# Patient Record
Sex: MALE | Race: WHITE | URBAN - METROPOLITAN AREA
[De-identification: names, ages, dates, MRNs, and addresses within clinical notes are randomized per-mention and may not be internally consistent; named-entity substitution may affect disease eponyms.]

---

## 2017-02-14 ENCOUNTER — OFFICE VISIT (OUTPATIENT)
Dept: FAMILY MEDICINE | Facility: CLINIC | Age: 22
End: 2017-02-14

## 2017-02-14 VITALS
HEART RATE: 83 BPM | WEIGHT: 210 LBS | SYSTOLIC BLOOD PRESSURE: 138 MMHG | BODY MASS INDEX: 28.44 KG/M2 | HEIGHT: 72 IN | TEMPERATURE: 97.5 F | DIASTOLIC BLOOD PRESSURE: 78 MMHG

## 2017-02-14 DIAGNOSIS — B34.9 VIRAL SYNDROME: Primary | ICD-10-CM

## 2017-02-14 RX ORDER — FLUTICASONE PROPIONATE 50 MCG
1-2 SPRAY, SUSPENSION (ML) NASAL DAILY
Qty: 1 BOTTLE | Refills: 11 | Status: SHIPPED | OUTPATIENT
Start: 2017-02-14

## 2017-02-14 RX ORDER — OSELTAMIVIR PHOSPHATE 75 MG/1
75 CAPSULE ORAL 2 TIMES DAILY
Qty: 10 CAPSULE | Refills: 0 | Status: SHIPPED | OUTPATIENT
Start: 2017-02-14

## 2017-02-14 NOTE — LETTER
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  Shivers and chills and ST.  No fever this am.  Sleeping a lot.  Coughing some today.  No ear pain. No flu shot.  No abdominal pain or symptoms.    NKDA    O: NAD  Congested  /78  Pulse 83  Temp 97.5  F (36.4  C)  Ht 1.829 m (6')  Wt 95.3 kg (210 lb)  BMI 28.48 kg/m2  HEENT:  Moderate erythema without exudates  Neck: no LAD, supple  Lungs: CTA  Heart; rrr    A: Viral Syndrome: Possible influenza  P:  Tamiflu 75mg bid for 5 days  Flonase nasal spray  Rest  Fluids  No practice today.  They are off tomorrow.  Ok to practice on Thursday if afebrile x 24 hours and feeling better.     Siri Garza, Sports Medicine Fellow and Christianne Smith, MARIAM for football were present for the entire appt.     Kaela Jones MD, CAQ, FACSM, CCD  [facility]  Sports Medicine and Bone Health  Team Physician;  Athletics        Kaela Jones MD

## 2017-02-14 NOTE — LETTER
Date:February 15, 2017      Patient was self referred, no letter generated. Do not send.        [facility] Health Information

## 2017-02-14 NOTE — PROGRESS NOTES
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  Shivers and chills and ST.  No fever this am.  Sleeping a lot.  Coughing some today.  No ear pain. No flu shot.  No abdominal pain or symptoms.    NKDA    O: NAD  Congested  /78  Pulse 83  Temp 97.5  F (36.4  C)  Ht 1.829 m (6')  Wt 95.3 kg (210 lb)  BMI 28.48 kg/m2  HEENT:  Moderate erythema without exudates  Neck: no LAD, supple  Lungs: CTA  Heart; rrr    A: Viral Syndrome: Possible influenza  P:  Tamiflu 75mg bid for 5 days  Flonase nasal spray  Rest  Fluids  No practice today.  They are off tomorrow.  Ok to practice on Thursday if afebrile x 24 hours and feeling better.     Siri Garza, Sports Medicine Fellow and Christianne Smith, MARIAM for football were present for the entire appt.     Kaela Jones MD, CAQ, FACSM, CCD  [facility]  Sports Medicine and Bone Health  Team Physician;  Athletics

## 2017-02-14 NOTE — MR AVS SNAPSHOT
After Visit Summary   2017    Mervat Best    MRN: [id number]           Patient Information     Date Of Birth          1995        Visit Information        Provider Department      2017 9:15 AM Kaela Jones MD [facility]        Today's Diagnoses     Viral syndrome    -  1       Follow-ups after your visit        Who to contact     Please call your clinic at [phone] to:    Ask questions about your health    Make or cancel appointments    Discuss your medicines    Learn about your test results    Speak to your doctor   If you have compliments or concerns about an experience at your clinic, or if you wish to file a complaint, please contact [facility] Patient Relations at [phone] or email us at [email or web address]         Additional Information About Your Visit        MyChart Information     [employer] is an electronic gateway that provides easy, online access to your medical records. With [employer], you can request a clinic appointment, read your test results, renew a prescription or communicate with your care team.     To sign up for [employer] visit the website at [email or web address]   You will be asked to enter the access code listed below, as well as some personal information. Please follow the directions to create your username and password.     Your access code is: [email or web address]  Expires: 5/15/2017  9:46 AM     Your access code will  in 90 days. If you need help or a new code, please contact your [facility] or call [phone] for assistance.        Care EveryWhere ID     This is your Care EveryWhere ID. This could be used by other organizations to access your [place] medical records  [id number]        Your Vitals Were     Pulse Temperature Height BMI (Body Mass Index)          83 97.5  F (36.4  C) 1.829 m (6') 28.48 kg/m2         Blood Pressure from Last  3 Encounters:   02/14/17 138/78   08/06/15 134/68   06/14/15 116/68    Weight from Last 3 Encounters:   02/14/17 95.3 kg (210 lb)   08/06/15 99.8 kg (220 lb) (97 %)*   06/14/15 99.8 kg (220 lb) (97 %)*     * Growth percentiles are based on [place] 2-20 Years data.              Today, you had the following     No orders found for display         Today's Medication Changes          These changes are accurate as of: 2/14/17  9:46 AM.  If you have any questions, ask your nurse or doctor.               Start taking these medicines.        Dose/Directions    fluticasone 50 MCG/ACT spray   Commonly known as:  FLONASE   Used for:  Viral syndrome   Started by:  Kaela Jones MD        Dose:  1-2 spray   Spray 1-2 sprays into both nostrils daily   Quantity:  1 Bottle   Refills:  11       oseltamivir 75 MG capsule   Commonly known as:  TAMIFLU   Used for:  Viral syndrome   Started by:  Kaela Jones MD        Dose:  75 mg   Take 1 capsule (75 mg) by mouth 2 times daily   Quantity:  10 capsule   Refills:  0            Where to get your medicines      These medications were sent to [place] [address] [place] [address] [address]     Phone:  [phone]     fluticasone 50 MCG/ACT spray    oseltamivir 75 MG capsule                Primary Care Provider    None Specified       No primary provider on file.        Thank you!     Thank you for choosing [facility]  for your care. Our goal is always to provide you with excellent care. Hearing back from our patients is one way we can continue to improve our services. Please take a few minutes to complete the written survey that you may receive in the mail after your visit with us. Thank you!             Your Updated Medication List - Protect others around you: Learn how to safely use, store and throw away your medicines at www.disposemymeds.org.          This list is accurate as of:  2/14/17  9:46 AM.  Always use your most recent med list.                   Brand Name Dispense Instructions for use    fluticasone 50 MCG/ACT spray    FLONASE    1 Bottle    Spray 1-2 sprays into both nostrils daily       oseltamivir 75 MG capsule    TAMIFLU    10 capsule    Take 1 capsule (75 mg) by mouth 2 times daily

## 2017-08-15 ENCOUNTER — OFFICE VISIT (OUTPATIENT)
Dept: FAMILY MEDICINE | Facility: CLINIC | Age: 22
End: 2017-08-15

## 2017-08-15 DIAGNOSIS — L03.031: Primary | ICD-10-CM

## 2017-08-15 RX ORDER — SULFAMETHOXAZOLE/TRIMETHOPRIM 800-160 MG
1 TABLET ORAL 2 TIMES DAILY
Qty: 14 TABLET | Refills: 0 | Status: SHIPPED | OUTPATIENT
Start: 2017-08-15 | End: 2017-08-22

## 2017-08-15 NOTE — PROGRESS NOTES
SUBJECTIVE:  Mervat Best is a 21 year old male who presents today complaining of painful right great toenail.  No injury or trauma.  Has not had difficulty with this is the past. No fevers.  Recently began spontaneously draining purulent fluid with improvement of pain and redness.  Area of erythema was recently outlined by ATC without worsening or extension.    OBJECTIVE:  VSS, reviewed  Cardiac: WNL  Pulm: WNL  The toenail is intact. There is redness along the lateral toe edge with evidence of recent drainage.  No active drainage, induration or fluctuance.  Erythematous area has been outlined with marker.  There is no granulation tissue noted.    ASSESSMENT:  Peronycheal abscess, ingrown toenail    PLAN:  Bactrim DS BID x7 days.  Daily foot soak in antibacterial soap/water solution. Signs and symptoms of beginning infection are discussed.  If no improvement, could consider I&D.  Dr Horne

## 2017-08-15 NOTE — LETTER
8/15/2017      RE: Mervat Best  [address] [facility] [address]       SUBJECTIVE:  Mervat Best is a 21 year old male who presents today complaining of painful right great toenail.  No injury or trauma.  Has not had difficulty with this is the past. No fevers.  Recently began spontaneously draining purulent fluid with improvement of pain and redness.  Area of erythema was recently outlined by ATC without worsening or extension.    OBJECTIVE:  VSS, reviewed  Cardiac: WNL  Pulm: WNL  The toenail is intact. There is redness along the lateral toe edge with evidence of recent drainage.  No active drainage, induration or fluctuance.  Erythematous area has been outlined with marker.  There is no granulation tissue noted.    ASSESSMENT:  Peronycheal abscess, ingrown toenail    PLAN:  Bactrim DS BID x7 days.  Daily foot soak in antibacterial soap/water solution. Signs and symptoms of beginning infection are discussed.  If no improvement, could consider I&D.  Dr Ozzie Horne MD

## 2017-08-15 NOTE — LETTER
Date:August 17, 2017      Patient was self referred, no letter generated. Do not send.        [facility] Health Information

## 2017-08-15 NOTE — MR AVS SNAPSHOT
After Visit Summary   8/15/2017    Mervat Best    MRN: [id number]           Patient Information     Date Of Birth          1995        Visit Information        Provider Department      8/15/2017 5:30 PM Jacky Horne MD [facility]        Today's Diagnoses     Inflamed toenail, right    -  1       Follow-ups after your visit        Who to contact     Please call your clinic at [phone] to:    Ask questions about your health    Make or cancel appointments    Discuss your medicines    Learn about your test results    Speak to your doctor   If you have compliments or concerns about an experience at your clinic, or if you wish to file a complaint, please contact [facility] Patient Relations at [phone] or email us at [email or web address]         Additional Information About Your Visit        MyChart Information     [employer] is an electronic gateway that provides easy, online access to your medical records. With [employer]u, you can request a clinic appointment, read your test results, renew a prescription or communicate with your care team.     To sign up for [employer] visit the website at [email or web address]   You will be asked to enter the access code listed below, as well as some personal information. Please follow the directions to create your username and password.     Your access code is: [email or web address]  Expires: 2017 10:36 AM     Your access code will  in 90 days. If you need help or a new code, please contact your [facility] or call [phone] for assistance.        Care EveryWhere ID     This is your Care EveryWhere ID. This could be used by other organizations to access your [place] medical records  [id number]         Blood Pressure from Last 3 Encounters:   17 138/78   08/06/15 134/68   06/14/15 116/68    Weight from Last 3 Encounters:   17 95.3 kg (210  lb)   08/06/15 99.8 kg (220 lb) (97 %)*   06/14/15 99.8 kg (220 lb) (97 %)*     * Growth percentiles are based on [facility][note 2] 2-20 Years data.              Today, you had the following     No orders found for display         Today's Medication Changes          These changes are accurate as of: 8/15/17 11:59 PM.  If you have any questions, ask your nurse or doctor.               Start taking these medicines.        Dose/Directions    sulfamethoxazole-trimethoprim 800-160 MG per tablet   Commonly known as:  BACTRIM DS        Dose:  1 tablet   Take 1 tablet by mouth 2 times daily for 7 days   Quantity:  14 tablet   Refills:  0            Where to get your medicines      These medications were sent to [facility] [address] [place] [facility] [place], [place] - [address]   [address] [facility] [address]     Phone:  [phone]     sulfamethoxazole-trimethoprim 800-160 MG per tablet                Primary Care Provider    None Specified       No primary provider on file.        Equal Access to Services     LUCERO FALLON : Jackson Cheung, dania robertson, miranda covarrubias, connor mathur . So [facility] [phone].    ATENCIÓN: Si habla español, tiene a hay disposición servicios gratuitos de asistencia lingüística. Llame al [phone].    We comply with applicable federal civil rights laws and [place] laws. We do not discriminate on the basis of race, color, national origin, age, disability sex, sexual orientation or gender identity.            Thank you!     Thank you for choosing [facility]  for your care. Our goal is always to provide you with excellent care. Hearing back from our patients is one way we can continue to improve our services. Please take a few minutes to complete the written survey that you may receive in the mail after your visit with us. Thank you!             Your Updated Medication List - Protect others around you: Learn how to safely use,  store and throw away your medicines at www.disposemymeds.org.          This list is accurate as of: 8/15/17 11:59 PM.  Always use your most recent med list.                   Brand Name Dispense Instructions for use Diagnosis    fluticasone 50 MCG/ACT spray    FLONASE    1 Bottle    Spray 1-2 sprays into both nostrils daily    Viral syndrome       oseltamivir 75 MG capsule    TAMIFLU    10 capsule    Take 1 capsule (75 mg) by mouth 2 times daily    Viral syndrome       sulfamethoxazole-trimethoprim 800-160 MG per tablet    BACTRIM DS    14 tablet    Take 1 tablet by mouth 2 times daily for 7 days

## 2017-11-22 ENCOUNTER — OFFICE VISIT (OUTPATIENT)
Dept: FAMILY MEDICINE | Facility: CLINIC | Age: 22
End: 2017-11-22

## 2017-11-22 VITALS — WEIGHT: 212 LBS | BODY MASS INDEX: 28.1 KG/M2 | HEIGHT: 73 IN

## 2017-11-22 DIAGNOSIS — J06.9 UPPER RESPIRATORY TRACT INFECTION, UNSPECIFIED TYPE: ICD-10-CM

## 2017-11-22 DIAGNOSIS — R05.9 COUGH: Primary | ICD-10-CM

## 2017-11-22 RX ORDER — BENZONATATE 200 MG/1
200 CAPSULE ORAL 3 TIMES DAILY PRN
Qty: 21 CAPSULE | Refills: 0 | Status: SHIPPED | OUTPATIENT
Start: 2017-11-22

## 2017-11-22 RX ORDER — FLUTICASONE PROPIONATE 50 MCG
1-2 SPRAY, SUSPENSION (ML) NASAL DAILY
Qty: 1 BOTTLE | Refills: 11 | Status: SHIPPED | OUTPATIENT
Start: 2017-11-22

## 2017-11-22 NOTE — LETTER
Date:November 24, 2017      Patient was self referred, no letter generated. Do not send.        [facility] Health Information

## 2017-11-22 NOTE — PROGRESS NOTES
SUBJECTIVE: 22 year old male complaining of cough for 8 day(s).   The patient describes no sore throat or fevers, mild congestion  Has not tried to treat yet with any medications.     Review Of Systems  Skin: negative  Eyes: negative    Cardiovascular: negative  Gastrointestinal: negative  Genitourinary: negative  Musculoskeletal: negative  Neurologic: negative  Psychiatric: negative  Hematologic/Lymphatic/Immunologic: negative  Endocrine: negative    VSS, afebrile, /76, HR 65, RR14  OBJECTIVE: The patient appears healthy, alert and no distress.   EARS: negative  NOSE/SINUS: Nares normal. Septum midline. Mucosa abnormal, red,erythematous. No drainage or sinus tenderness.   THROAT: slightly red, postnasal drip present, no exudates  NECK:Neck supple. No adenopathy. Thyroid symmetric, normal size,, Carotids without bruits.   CHEST: Clear to auscultation    ASSESSMENT:   21 yo male with URI and post nasal drip    PLAN  Tessalon perles  flonase  Stay well hydrated  Monitor for fevers  Return if worse  Dr Horne

## 2017-11-22 NOTE — MR AVS SNAPSHOT
"              After Visit Summary   2017    Mervat Best    MRN: [id number]           Patient Information     Date Of Birth          1995        Visit Information        Provider Department      2017 11:45 AM Jacky Horne MD [facility]        Today's Diagnoses     Cough    -  1    Upper respiratory tract infection, unspecified type           Follow-ups after your visit        Who to contact     Please call your clinic at [phone] to:    Ask questions about your health    Make or cancel appointments    Discuss your medicines    Learn about your test results    Speak to your doctor   If you have compliments or concerns about an experience at your clinic, or if you wish to file a complaint, please contact [facility] Patient Relations at [phone] or email us at [email or web address]         Additional Information About Your Visit        MyChart Information     [employer]ist is an electronic gateway that provides easy, online access to your medical records. With Access Media 3, you can request a clinic appointment, read your test results, renew a prescription or communicate with your care team.     To sign up for Aimetist visit the website at [email or web address]   You will be asked to enter the access code listed below, as well as some personal information. Please follow the directions to create your username and password.     Your access code is: [email or web address]  Expires: 2018  1:48 PM     Your access code will  in 90 days. If you need help or a new code, please contact your [facility] or call [phone] for assistance.        Care EveryWhere ID     This is your Care EveryWhere ID. This could be used by other organizations to access your [place] medical records  [id number]        Your Vitals Were     Height BMI (Body Mass Index)                1.854 m (6' 1\") 27.97 kg/m2           Blood " Pressure from Last 3 Encounters:   02/14/17 138/78   08/06/15 134/68   06/14/15 116/68    Weight from Last 3 Encounters:   11/22/17 96.2 kg (212 lb)   02/14/17 95.3 kg (210 lb)   08/06/15 99.8 kg (220 lb) (97 %)*     * Growth percentiles are based on [facility] 2-20 Years data.              Today, you had the following     No orders found for display         Today's Medication Changes          These changes are accurate as of: 11/22/17  1:48 PM.  If you have any questions, ask your nurse or doctor.               Start taking these medicines.        Dose/Directions    benzonatate 200 MG capsule   Commonly known as:  TESSALON   Used for:  Cough, Upper respiratory tract infection, unspecified type        Dose:  200 mg   Take 1 capsule (200 mg) by mouth 3 times daily as needed for cough   Quantity:  21 capsule   Refills:  0         These medicines have changed or have updated prescriptions.        Dose/Directions    * fluticasone 50 MCG/ACT spray   Commonly known as:  FLONASE   This may have changed:  Another medication with the same name was added. Make sure you understand how and when to take each.   Used for:  Viral syndrome        Dose:  1-2 spray   Spray 1-2 sprays into both nostrils daily   Quantity:  1 Bottle   Refills:  11       * fluticasone 50 MCG/ACT spray   Commonly known as:  FLONASE   This may have changed:  You were already taking a medication with the same name, and this prescription was added. Make sure you understand how and when to take each.   Used for:  Cough, Upper respiratory tract infection, unspecified type        Dose:  1-2 spray   Spray 1-2 sprays into both nostrils daily   Quantity:  1 Bottle   Refills:  11       * Notice:  This list has 2 medication(s) that are the same as other medications prescribed for you. Read the directions carefully, and ask your doctor or other care provider to review them with you.         Where to get your medicines      These medications were sent to [facility] [employer]  - [place], [place] - [address]  [address], [facility] [address]     Phone:  [phone]     benzonatate 200 MG capsule    fluticasone 50 MCG/ACT spray                Primary Care Provider    None Specified       No primary provider on file.        Equal Access to Services     LUCERO FALLON : Hadii aad ku hadklauscaterina Soconstanza, waaxda luqadaha, qaybta kaalmada adeguillermo, waxfatoumata milan jaisonelsa mckeonscottsadiq cardenas. So [facility] [phone].    ATENCIÓN: Si habla español, tiene a hay disposición servicios gratuitos de asistencia lingüística. Llame al [phone].    We comply with applicable federal civil rights laws and [place] laws. We do not discriminate on the basis of race, color, national origin, age, disability, sex, sexual orientation, or gender identity.            Thank you!     Thank you for choosing [facility]  for your care. Our goal is always to provide you with excellent care. Hearing back from our patients is one way we can continue to improve our services. Please take a few minutes to complete the written survey that you may receive in the mail after your visit with us. Thank you!             Your Updated Medication List - Protect others around you: Learn how to safely use, store and throw away your medicines at www.disposemymeds.org.          This list is accurate as of: 11/22/17  1:48 PM.  Always use your most recent med list.                   Brand Name Dispense Instructions for use Diagnosis    benzonatate 200 MG capsule    TESSALON    21 capsule    Take 1 capsule (200 mg) by mouth 3 times daily as needed for cough    Cough, Upper respiratory tract infection, unspecified type       * fluticasone 50 MCG/ACT spray    FLONASE    1 Bottle    Spray 1-2 sprays into both nostrils daily    Viral syndrome       * fluticasone 50 MCG/ACT spray    FLONASE    1 Bottle    Spray 1-2 sprays into both nostrils daily    Cough, Upper respiratory tract infection, unspecified type        oseltamivir 75 MG capsule    TAMIFLU    10 capsule    Take 1 capsule (75 mg) by mouth 2 times daily    Viral syndrome       * Notice:  This list has 2 medication(s) that are the same as other medications prescribed for you. Read the directions carefully, and ask your doctor or other care provider to review them with you.